# Patient Record
Sex: FEMALE | Race: BLACK OR AFRICAN AMERICAN | NOT HISPANIC OR LATINO | URBAN - METROPOLITAN AREA
[De-identification: names, ages, dates, MRNs, and addresses within clinical notes are randomized per-mention and may not be internally consistent; named-entity substitution may affect disease eponyms.]

---

## 2023-10-31 ENCOUNTER — AMBULATORY SURGICAL CENTER (OUTPATIENT)
Dept: URBAN - METROPOLITAN AREA SURGERY 2 | Facility: SURGERY | Age: 63
End: 2023-10-31
Payer: COMMERCIAL

## 2023-10-31 ENCOUNTER — OFFICE (OUTPATIENT)
Dept: URBAN - METROPOLITAN AREA PATHOLOGY 12 | Facility: PATHOLOGY | Age: 63
End: 2023-10-31
Payer: COMMERCIAL

## 2023-10-31 VITALS
WEIGHT: 226 LBS | SYSTOLIC BLOOD PRESSURE: 153 MMHG | OXYGEN SATURATION: 100 % | SYSTOLIC BLOOD PRESSURE: 137 MMHG | HEART RATE: 75 BPM | TEMPERATURE: 96.8 F | RESPIRATION RATE: 17 BRPM | TEMPERATURE: 96.8 F | RESPIRATION RATE: 17 BRPM | SYSTOLIC BLOOD PRESSURE: 132 MMHG | OXYGEN SATURATION: 99 % | DIASTOLIC BLOOD PRESSURE: 92 MMHG | SYSTOLIC BLOOD PRESSURE: 132 MMHG | OXYGEN SATURATION: 100 % | DIASTOLIC BLOOD PRESSURE: 87 MMHG | OXYGEN SATURATION: 100 % | HEART RATE: 75 BPM | TEMPERATURE: 98.2 F | WEIGHT: 226 LBS | SYSTOLIC BLOOD PRESSURE: 126 MMHG | SYSTOLIC BLOOD PRESSURE: 137 MMHG | DIASTOLIC BLOOD PRESSURE: 72 MMHG | OXYGEN SATURATION: 96 % | DIASTOLIC BLOOD PRESSURE: 87 MMHG | RESPIRATION RATE: 18 BRPM | TEMPERATURE: 98.2 F | DIASTOLIC BLOOD PRESSURE: 74 MMHG | HEART RATE: 78 BPM | DIASTOLIC BLOOD PRESSURE: 73 MMHG | DIASTOLIC BLOOD PRESSURE: 74 MMHG | HEART RATE: 79 BPM | SYSTOLIC BLOOD PRESSURE: 126 MMHG | TEMPERATURE: 96.8 F | RESPIRATION RATE: 18 BRPM | OXYGEN SATURATION: 96 % | HEIGHT: 63 IN | SYSTOLIC BLOOD PRESSURE: 129 MMHG | DIASTOLIC BLOOD PRESSURE: 72 MMHG | RESPIRATION RATE: 17 BRPM | DIASTOLIC BLOOD PRESSURE: 73 MMHG | HEART RATE: 94 BPM | DIASTOLIC BLOOD PRESSURE: 92 MMHG | HEART RATE: 81 BPM | SYSTOLIC BLOOD PRESSURE: 137 MMHG | SYSTOLIC BLOOD PRESSURE: 129 MMHG | DIASTOLIC BLOOD PRESSURE: 73 MMHG | HEART RATE: 81 BPM | HEART RATE: 75 BPM | SYSTOLIC BLOOD PRESSURE: 129 MMHG | TEMPERATURE: 98.2 F | DIASTOLIC BLOOD PRESSURE: 74 MMHG | OXYGEN SATURATION: 99 % | WEIGHT: 226 LBS | SYSTOLIC BLOOD PRESSURE: 126 MMHG | SYSTOLIC BLOOD PRESSURE: 153 MMHG | DIASTOLIC BLOOD PRESSURE: 87 MMHG | DIASTOLIC BLOOD PRESSURE: 72 MMHG | RESPIRATION RATE: 18 BRPM | OXYGEN SATURATION: 96 % | HEIGHT: 63 IN | DIASTOLIC BLOOD PRESSURE: 92 MMHG | SYSTOLIC BLOOD PRESSURE: 153 MMHG | HEART RATE: 79 BPM | HEART RATE: 78 BPM | HEART RATE: 94 BPM | SYSTOLIC BLOOD PRESSURE: 132 MMHG | HEART RATE: 81 BPM | HEART RATE: 79 BPM | OXYGEN SATURATION: 99 % | HEART RATE: 94 BPM | HEART RATE: 78 BPM | HEIGHT: 63 IN

## 2023-10-31 DIAGNOSIS — Z12.11 ENCOUNTER FOR SCREENING FOR MALIGNANT NEOPLASM OF COLON: ICD-10-CM

## 2023-10-31 DIAGNOSIS — D12.2 BENIGN NEOPLASM OF ASCENDING COLON: ICD-10-CM

## 2023-10-31 DIAGNOSIS — K57.30 DIVERTICULOSIS OF LARGE INTESTINE WITHOUT PERFORATION OR ABS: ICD-10-CM

## 2023-10-31 DIAGNOSIS — K63.5 POLYP OF COLON: ICD-10-CM

## 2023-10-31 PROCEDURE — 45380 COLONOSCOPY AND BIOPSY: CPT | Mod: 33 | Performed by: INTERNAL MEDICINE

## 2023-10-31 PROCEDURE — 88305 TISSUE EXAM BY PATHOLOGIST: CPT | Performed by: STUDENT IN AN ORGANIZED HEALTH CARE EDUCATION/TRAINING PROGRAM

## 2023-11-03 LAB
GASTRO ONE PATHOLOGY: PDF REPORT: (no result)

## 2025-05-28 ENCOUNTER — OFFICE (OUTPATIENT)
Dept: URBAN - METROPOLITAN AREA CLINIC 19 | Facility: CLINIC | Age: 65
End: 2025-05-28
Payer: COMMERCIAL

## 2025-05-28 VITALS
WEIGHT: 232 LBS | DIASTOLIC BLOOD PRESSURE: 80 MMHG | SYSTOLIC BLOOD PRESSURE: 130 MMHG | HEART RATE: 88 BPM | OXYGEN SATURATION: 100 % | HEIGHT: 63 IN

## 2025-05-28 DIAGNOSIS — K21.9 GASTRO-ESOPHAGEAL REFLUX DISEASE WITHOUT ESOPHAGITIS: ICD-10-CM

## 2025-05-28 DIAGNOSIS — Z86.0100 PERSONAL HISTORY OF COLON POLYPS, UNSPECIFIED: ICD-10-CM

## 2025-05-28 DIAGNOSIS — E66.01 MORBID (SEVERE) OBESITY DUE TO EXCESS CALORIES: ICD-10-CM

## 2025-05-28 DIAGNOSIS — R14.3 FLATULENCE: ICD-10-CM

## 2025-05-28 PROCEDURE — 99204 OFFICE O/P NEW MOD 45 MIN: CPT

## 2025-05-28 RX ORDER — LANSOPRAZOLE 30 MG/1
CAPSULE, DELAYED RELEASE PELLETS ORAL
Qty: 60 | Refills: 5 | Status: ACTIVE
Start: 2025-05-28

## 2025-05-28 RX ORDER — OMEPRAZOLE 40 MG/1
40 CAPSULE, DELAYED RELEASE ORAL
Refills: 0 | Status: COMPLETED
End: 2025-05-28

## 2025-05-28 NOTE — SERVICEHPINOTES
65-year-old female here with complaints of acid reflux. Taking omeprazole 40 mg once daily for several years which does not control her reflux. Symptoms seem to be exacerbated at nighttime. Does not take any NSAIDs or herbal supplement.  Does not drink carbonated beverages or sodas.  Drinks 1-2 cups of coffee weekly.  Drinks 1-2 alcoholic beverages monthly. Denies nausea or vomiting or trouble swallowing. Bowel movements are regular.   Complaining of gas for the last month.  Does not use artificial sweeteners.  Does not consume a lot of leafy green vegetables.  Avoids dairy products.  Taking over-the-counter Gas-X which has not improved her symptoms. Denies any rectal bleeding or abdominal pain or abnormal weight loss. No family history of Barretts esophagus, esophageal cancer, colon cancer, colon polyps, or IBD.  No cardiac issues and not on blood thinners or weight loss medications. Colonoscopy in October 2023 revealed sigmoid diverticulosis. One small tubular adenoma was removed.

## 2025-06-02 ENCOUNTER — OFFICE (OUTPATIENT)
Dept: URBAN - METROPOLITAN AREA PATHOLOGY 12 | Facility: PATHOLOGY | Age: 65
End: 2025-06-02
Payer: COMMERCIAL

## 2025-06-02 DIAGNOSIS — R11.0 NAUSEA: ICD-10-CM

## 2025-06-02 DIAGNOSIS — K31.7 POLYP OF STOMACH AND DUODENUM: ICD-10-CM

## 2025-06-02 DIAGNOSIS — K29.50 UNSPECIFIED CHRONIC GASTRITIS WITHOUT BLEEDING: ICD-10-CM

## 2025-06-02 PROBLEM — K92.2 GASTROINTESTINAL HEMORRHAGE, UNSPECIFIED: Status: ACTIVE | Noted: 2025-06-02

## 2025-06-02 PROBLEM — K44.9 DIAPHRAGMATIC HERNIA WITHOUT OBSTRUCTION OR GANGRENE: Status: ACTIVE | Noted: 2025-06-02

## 2025-06-02 PROCEDURE — 88342 IMHCHEM/IMCYTCHM 1ST ANTB: CPT | Performed by: PATHOLOGY

## 2025-06-02 PROCEDURE — 88313 SPECIAL STAINS GROUP 2: CPT | Performed by: PATHOLOGY

## 2025-06-02 PROCEDURE — 88341 IMHCHEM/IMCYTCHM EA ADD ANTB: CPT | Performed by: PATHOLOGY

## 2025-06-02 PROCEDURE — 88305 TISSUE EXAM BY PATHOLOGIST: CPT | Performed by: PATHOLOGY
